# Patient Record
Sex: FEMALE | Race: WHITE | Employment: FULL TIME | URBAN - METROPOLITAN AREA
[De-identification: names, ages, dates, MRNs, and addresses within clinical notes are randomized per-mention and may not be internally consistent; named-entity substitution may affect disease eponyms.]

---

## 2024-01-13 ENCOUNTER — APPOINTMENT (OUTPATIENT)
Dept: RADIOLOGY | Facility: CLINIC | Age: 44
End: 2024-01-13
Payer: COMMERCIAL

## 2024-01-13 DIAGNOSIS — Z12.31 SCREENING MAMMOGRAM FOR BREAST CANCER: ICD-10-CM

## 2024-01-27 ENCOUNTER — HOSPITAL ENCOUNTER (OUTPATIENT)
Dept: RADIOLOGY | Facility: CLINIC | Age: 44
Discharge: HOME | End: 2024-01-27
Payer: COMMERCIAL

## 2024-01-27 DIAGNOSIS — Z12.31 SCREENING MAMMOGRAM FOR BREAST CANCER: ICD-10-CM

## 2024-01-27 PROCEDURE — 77067 SCR MAMMO BI INCL CAD: CPT

## 2024-01-27 PROCEDURE — 77063 BREAST TOMOSYNTHESIS BI: CPT | Performed by: RADIOLOGY

## 2024-01-27 PROCEDURE — 77067 SCR MAMMO BI INCL CAD: CPT | Performed by: RADIOLOGY

## 2024-02-02 ENCOUNTER — HOSPITAL ENCOUNTER (OUTPATIENT)
Dept: RADIOLOGY | Facility: CLINIC | Age: 44
Discharge: HOME | End: 2024-02-02
Payer: COMMERCIAL

## 2024-02-02 DIAGNOSIS — R92.8 OTHER ABNORMAL AND INCONCLUSIVE FINDINGS ON DIAGNOSTIC IMAGING OF BREAST: ICD-10-CM

## 2024-02-02 PROCEDURE — 77061 BREAST TOMOSYNTHESIS UNI: CPT | Mod: LEFT SIDE | Performed by: RADIOLOGY

## 2024-02-02 PROCEDURE — 77061 BREAST TOMOSYNTHESIS UNI: CPT | Mod: LT

## 2024-02-02 PROCEDURE — 77065 DX MAMMO INCL CAD UNI: CPT | Mod: LEFT SIDE | Performed by: RADIOLOGY

## 2024-03-19 ENCOUNTER — OFFICE VISIT (OUTPATIENT)
Dept: OBSTETRICS AND GYNECOLOGY | Facility: CLINIC | Age: 44
End: 2024-03-19
Payer: COMMERCIAL

## 2024-03-19 VITALS
OXYGEN SATURATION: 98 % | HEART RATE: 77 BPM | SYSTOLIC BLOOD PRESSURE: 130 MMHG | DIASTOLIC BLOOD PRESSURE: 83 MMHG | HEIGHT: 59 IN | BODY MASS INDEX: 29.98 KG/M2 | WEIGHT: 148.7 LBS

## 2024-03-19 DIAGNOSIS — N39.3 SUI (STRESS URINARY INCONTINENCE, FEMALE): ICD-10-CM

## 2024-03-19 DIAGNOSIS — Z01.419 ENCOUNTER FOR ANNUAL ROUTINE GYNECOLOGICAL EXAMINATION: Primary | ICD-10-CM

## 2024-03-19 PROCEDURE — 99396 PREV VISIT EST AGE 40-64: CPT | Performed by: OBSTETRICS & GYNECOLOGY

## 2024-03-19 NOTE — PROGRESS NOTES
Subjective   Patient ID: Ashley Chappell is a 43 y.o. female who presents for Annual Exam (LMP 24/PAP 21 /Mammo 24).  HPI  Patient is a 43-year-old  2 para 2 white female whose had 2 spontaneous vaginal deliveries.  Last menstrual period  she said they are typically 1 month apart she will bleed anywhere from 4 to 5 days very manageable currently uses vasectomy for birth control.  She admits to regular bowel movements does complain of leaking urine with coughing sneezing and jumping.  Last normal Pap smear 2021 patient had mammogram in 2024 had to have additional left imaging which was normal repeat in 1 year.  Review of Systems    Objective   Physical Exam  Gen.: Alert and in no acute distress. Well-developed, well-nourished.  Thyroid: Nonenlarged and no palpable thyroid nodules  Cardiovascular: Heart regular rate and rhythm  Pulmonary: Clear bilateral breath sounds  Breasts: Normal appearance, no nipple discharge and no skin changes. No palpable masses and no axillary lymphadenopathy  Abdomen: Soft and nontender, no abdominal mass palpated, no organomegaly   Pelvic: External genitalia normal, Bartholin's urethral and [place]'s glands normal. Vagina normal. Cervix normal. Uterus normal size and shape. Right adnexa normal without masses. Left adnexa normal without masses. Perianal area and normal.  Assessment/Plan   Annual GYN exam, Pap and HPV not needed this year, reviewed recent mammogram follow-up in 1 year.  Discussed stress urinary incontinence, will refer to urogynecology.         Morales Hatch MD 24 3:49 PM

## 2024-03-29 ENCOUNTER — OFFICE VISIT (OUTPATIENT)
Dept: OBSTETRICS AND GYNECOLOGY | Facility: CLINIC | Age: 44
End: 2024-03-29
Payer: COMMERCIAL

## 2024-03-29 VITALS
HEIGHT: 59 IN | WEIGHT: 148 LBS | TEMPERATURE: 97.8 F | BODY MASS INDEX: 29.84 KG/M2 | HEART RATE: 93 BPM | DIASTOLIC BLOOD PRESSURE: 79 MMHG | SYSTOLIC BLOOD PRESSURE: 114 MMHG | RESPIRATION RATE: 16 BRPM

## 2024-03-29 DIAGNOSIS — N39.3 SUI (STRESS URINARY INCONTINENCE, FEMALE): ICD-10-CM

## 2024-03-29 DIAGNOSIS — F17.200 SMOKER: Primary | ICD-10-CM

## 2024-03-29 PROCEDURE — 99204 OFFICE O/P NEW MOD 45 MIN: CPT | Performed by: STUDENT IN AN ORGANIZED HEALTH CARE EDUCATION/TRAINING PROGRAM

## 2024-03-29 PROCEDURE — 99214 OFFICE O/P EST MOD 30 MIN: CPT | Performed by: STUDENT IN AN ORGANIZED HEALTH CARE EDUCATION/TRAINING PROGRAM

## 2024-03-29 SDOH — ECONOMIC STABILITY: FOOD INSECURITY: WITHIN THE PAST 12 MONTHS, THE FOOD YOU BOUGHT JUST DIDN'T LAST AND YOU DIDN'T HAVE MONEY TO GET MORE.: NEVER TRUE

## 2024-03-29 SDOH — ECONOMIC STABILITY: FOOD INSECURITY: WITHIN THE PAST 12 MONTHS, YOU WORRIED THAT YOUR FOOD WOULD RUN OUT BEFORE YOU GOT MONEY TO BUY MORE.: NEVER TRUE

## 2024-03-29 ASSESSMENT — ENCOUNTER SYMPTOMS
DEPRESSION: 0
OCCASIONAL FEELINGS OF UNSTEADINESS: 0
LOSS OF SENSATION IN FEET: 0

## 2024-03-29 ASSESSMENT — COLUMBIA-SUICIDE SEVERITY RATING SCALE - C-SSRS
2. HAVE YOU ACTUALLY HAD ANY THOUGHTS OF KILLING YOURSELF?: NO
6. HAVE YOU EVER DONE ANYTHING, STARTED TO DO ANYTHING, OR PREPARED TO DO ANYTHING TO END YOUR LIFE?: NO
1. IN THE PAST MONTH, HAVE YOU WISHED YOU WERE DEAD OR WISHED YOU COULD GO TO SLEEP AND NOT WAKE UP?: NO

## 2024-03-29 ASSESSMENT — LIFESTYLE VARIABLES
HOW OFTEN DO YOU HAVE SIX OR MORE DRINKS ON ONE OCCASION: NEVER
SKIP TO QUESTIONS 9-10: 1
HOW OFTEN DO YOU HAVE A DRINK CONTAINING ALCOHOL: MONTHLY OR LESS
HOW MANY STANDARD DRINKS CONTAINING ALCOHOL DO YOU HAVE ON A TYPICAL DAY: PATIENT DOES NOT DRINK
AUDIT-C TOTAL SCORE: 1

## 2024-03-29 ASSESSMENT — PATIENT HEALTH QUESTIONNAIRE - PHQ9
1. LITTLE INTEREST OR PLEASURE IN DOING THINGS: NOT AT ALL
2. FEELING DOWN, DEPRESSED OR HOPELESS: NOT AT ALL
SUM OF ALL RESPONSES TO PHQ9 QUESTIONS 1 AND 2: 0

## 2024-03-29 ASSESSMENT — PAIN SCALES - GENERAL: PAINLEVEL: 0-NO PAIN

## 2024-03-29 NOTE — PROGRESS NOTES
Referred by: Dr. Hatch     PCP  Devonte Beasley MD         CHIEF COMPLAINT:  GIANCARLO         HISTORY OF PRESENT ILLNESS:  This is a  43 y.o. y.o. female who presents with urinary incontinence. Noted with coughing, sneezing and jumping. First noted a couple years ago. Worsening recently, sometimes even with brushing her teeth (from slight gagging).     Does kegels at home.     The following were reviewed to gain additional history:  External notes: Dr. Hatch 3/19/24  Test results: pap 11/19/21, neg/neg         Specifically, she describes the following pelvic floor symptoms:          Prolapse: No       - Splinting to urinate: No       - Splinting for bowel movement/stool trapping: No              Incontinence:  Yes             Stress              Urinary Symptoms:       - Frequency:  No             # Voids:         - Nocturia: No             # Voids:         - Urgency:  No       - Incomplete emptying:  No       - Hesitancy:  No       - Pain with voiding:  No       - Excessive fluid intake: Yes, coffee in the morning, then water during the day, up to 100 oz               History:       - Recurrent UTI:  No       - Hematuria:  No       - Stones:  No       - Kidney Disease:  No                  Bowel Symptoms:       - Regular: Yes       - Diarrhea:No       - Constipation: No       - Fecal Incontinence:  No       - Flatus Incontinence:  No       - Fecal urgency:   No    Past medical and surgical hx reviewed - pertinent for no PMH, no abdominal surgery  Smoking history:  current daily cigarette use, 0.25 ppd        Gyn History:  - Menopausal: No  - HRT: No  - Pap up to date: Yes - 2021  - Sexually active:  Yes  - Number of prior vaginal deliveries: 2   Number of prior operative deliveries: 0   Prior OASI? no  - Number of prior c-sections: 0    - Mammogram up to date: Yes -   - Colonoscopy up to date: n/a    OB History    No obstetric history on file.               PHYSICAL EXAMINATION:  Patient's last menstrual period  was 03/05/2024.  There is no height or weight on file to calculate BMI.  LMP 03/05/2024   General Appearance: well appearing  Neuro: Alert and oriented   HEENT: mucous membranes moist, neck supple  Resp: No respiratory distress, normal work of breathing  MSK: normal range of motion, gait appropriate    Pelvic:  Genitourinary: normal external genitalia, Bartholin's glands negative, [place]'s glands negative  Urethra: normal meatus, non-tender, no periurethral mass  Vaginal mucosa  normal  Cervix  normal  Uterus normal size, non-tender, mobile  Adnexae  negative nontender, no masses  Atrophy negative    CST negative    POP-Q (in supine position):  No significant prolapse    Rectal: no hemorrhoids, fissures or masses    PVR (by Ultrasound): 0 ml   Urine dip: No results found for this or any previous visit.       IMPRESSION AND PLAN:  Ashley Chappell is a 43 y.o. who presents with GIANCARLO.    GIANCARLO  - discussed etiology of GIANCARLO and potential risk factors  - reviewed management strategies including PFPT, anti-incontinence ring, urethral bulking injections and midurethral sling placement  - Reviewed options for surgical management of GIANCARLO: midurethral sling versus periurethral bulking injections and steps of each procedure  - Counseled on success rates of each: sling ~95% successful and bulkamid 60-70% success  - Reviewed risks/benefits of each option. Sling carries the benefit of higher success rate but has 2 week recovery period with no lifting over 10 pounds, and involves mesh which carries a 2-4% risk of mesh exposure. Bulking carries lower success rate but has no recovery period and no mesh risk involved.  - **reviewed increased risk of mesh exposure in setting of current cigarette smoking; advised ideal option prior to surgery is to discontinue smoking approx 1 month prior to surgery  - Counseled on management of mesh exposure if that should happen: often managed with vaginal estrogen in the office and rarely requires  return to OR for mesh excision  - Reviewed risk of urinary retention requiring indwelling catheter temporarily which would be removed in the office following surgery  - Would need urodynamics prior to surgery? Yes  - Wanted to know options, PI sheets given today    Will call back if she opts to pursue anything    All questions and concerns were answered and addressed.  The patient expressed understanding and agrees with the plan.     3/29/2024       Kimber Fair MD

## 2025-02-01 ENCOUNTER — HOSPITAL ENCOUNTER (OUTPATIENT)
Dept: RADIOLOGY | Facility: CLINIC | Age: 45
Discharge: HOME | End: 2025-02-01
Payer: COMMERCIAL

## 2025-02-01 VITALS — BODY MASS INDEX: 29.84 KG/M2 | WEIGHT: 148 LBS | HEIGHT: 59 IN

## 2025-02-01 DIAGNOSIS — Z12.31 SCREENING MAMMOGRAM FOR BREAST CANCER: ICD-10-CM

## 2025-02-01 PROCEDURE — 77067 SCR MAMMO BI INCL CAD: CPT | Performed by: RADIOLOGY

## 2025-02-01 PROCEDURE — 77063 BREAST TOMOSYNTHESIS BI: CPT | Performed by: RADIOLOGY

## 2025-02-01 PROCEDURE — 77067 SCR MAMMO BI INCL CAD: CPT

## 2025-02-05 ENCOUNTER — TELEPHONE (OUTPATIENT)
Dept: OBSTETRICS AND GYNECOLOGY | Facility: CLINIC | Age: 45
End: 2025-02-05
Payer: COMMERCIAL

## 2025-02-05 DIAGNOSIS — R92.8 ABNORMAL MAMMOGRAM: Primary | ICD-10-CM

## 2025-02-05 NOTE — TELEPHONE ENCOUNTER
Spoke to patient on the phone. Reviewed recent mammogram and need for additional views. Recent history of additional views needed. Recommend consultation with breast specialist to discuss mammograms.

## 2025-02-10 ENCOUNTER — HOSPITAL ENCOUNTER (OUTPATIENT)
Dept: RADIOLOGY | Facility: CLINIC | Age: 45
Discharge: HOME | End: 2025-02-10
Payer: COMMERCIAL

## 2025-02-10 DIAGNOSIS — R92.8 ABNORMAL MAMMOGRAM: ICD-10-CM

## 2025-02-10 PROCEDURE — 77066 DX MAMMO INCL CAD BI: CPT | Performed by: STUDENT IN AN ORGANIZED HEALTH CARE EDUCATION/TRAINING PROGRAM

## 2025-02-10 PROCEDURE — 77062 BREAST TOMOSYNTHESIS BI: CPT | Performed by: STUDENT IN AN ORGANIZED HEALTH CARE EDUCATION/TRAINING PROGRAM

## 2025-02-10 PROCEDURE — 77066 DX MAMMO INCL CAD BI: CPT

## 2025-02-17 ENCOUNTER — APPOINTMENT (OUTPATIENT)
Dept: SURGICAL ONCOLOGY | Facility: CLINIC | Age: 45
End: 2025-02-17
Payer: COMMERCIAL

## 2025-02-25 NOTE — PROGRESS NOTES
History Of Present Illness :  Ashley Chappell is a 44 y.o. female, patient of Dr. Beasley, who presents on referral from Dr. Jw Hatch for breast evaluation.  The patient was last seen by Dr. Hatch on 3/19/2024.  That note was reviewed.  Following recent mammographic findings, the patient is referred to the [facility] breast clinic for evaluation.    Patient has no complaints with regard to the breasts such as mass, pain, nipple discharge, or skin or nipple changes.    All of the patient's recent imaging was reviewed dating back to .  I personally reviewed all the reports and images.  The patient does have heterogeneously dense breast tissue.  Bilateral digital screening mammography with tomosynthesis on 2022 was negative with no suspicious densities or microcalcifications.  Over a year later on 2024, bilateral digital screening mammography revealed a minor asymmetric lesion in the lower inner quadrant of the left breast posteriorly.  Follow-up diagnostic images on 2024 revealed no persistent lesion at the site in question.  The following year, earlier this month, on 2025, the patient again underwent bilateral digital screening mammography and some minor asymmetric breast tissue was identified mid depth medially on the right side cc view, and anterior to mid depth on the MLO view superiorly with regard to the left breast.  Both of these areas of asymmetric tissue compressed out into normal fibroglandular tissue with diagnostic compression images on 2/10/2025.    Risk factor assessment includes age of 44, age of menarche 15, and age of first live birth of 26.  She is  2 para 2 abortus 0.  Risk factor assessment includes age of 44, age of menarche 15, and age of first live birth of 26.  She is  2 para 2 abortus 0.  She has never had a previous breast biopsy.  She has no family history of breast cancer.    Based on the Breast Cancer Risk Assessment Tool from the National Cancer  [place] (NCI), the patient's breast cancer risk is as follows:    5--Year Risk of Developing Breast Cancer    Patient Risk  0.7%  Average Risk  0.8%  Based on the information provided, the patient's estimated risk for developing invasive breast cancer over the next 5 years is 0.7%, presented in green since hers is lower than the average risk of 0.8% (presented in blue) for women of the same age and race/ethnicity in the general [place] population.      Lifetime Risk of Developing Breast Cancer    Patient Risk  9.9%  Average Risk  12.1%  Based on the information provided, the woman's estimated risk for developing invasive breast cancer over her lifetime (to age 90) is 9.9%, presented in green since hers is lower than the average risk of 12.1% (presented in blue) for women of the same age and race/ethnicity in the general [place] population.    Patient is .  She has 2 children.  She lives in [place].  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    Past Medical History   Past Medical History:   Diagnosis Date    Other specified health status     Known health problems: none        Surgical History    No past surgical history on file.     Allergies   Allergies   Allergen Reactions    Amoxicillin Hives and Rash        Home Meds  Current Outpatient Medications   Medication Instructions    APPLE CIDER VINEGAR ORAL oral    Ca-D3-mag-zinc--karen-boron 600 mg calcium- 800 unit-40 mg tablet,chewable oral        Family History    Family History   Problem Relation Name Age of Onset    Breast cancer Other          maternal cousin        Social History  Social History     Tobacco Use    Smoking status: Every Day     Current packs/day: 0.25     Types: Cigarettes    Smokeless tobacco: Never   Substance Use Topics    Alcohol use: Yes     Comment: occasion    Drug use: Never        Review Of Systems    Review of Systems    General: Not Present- Obesity, Cancer, HIV, MRSA, Recent Cold/Flu, Tired  During the Day and VRE.  HEENT: Not Present- Migraine, Cataracts, Glaucoma, Macular Degeneration and Retinal Detachment.  Respiratory: Not Present- Asthma, Chronic Cough, Difficulty Breathing on Exertion, Difficulty Breathing at Rest, Emphysema, Frequent Bronchitis, Home CPAP/BiPAP, Home Oxygen, Pulmonary Embolus, Pneumonia/TB, Sleep Apnea and Snoring.  Cardiovascular: Not Present- Chest Pain, Congestive Heart Failure, Heart Attack, Coronary Artery Disease, Heart Stent, High Cholesterol/Lipids, Hypertension, Internal Defibrillator, Irregular Heart Beat, Mitral Valve Prolapse, Murmur, Pacemaker and Peripheral Vascular Disease.  Gastrointestinal: Not Present- Heartburn, Hepatitis, Hiatal Hernia, Jaundice, Reflux, Stomach Ulcer and IBS.  Female Genitourinary: Not Present- Kidney Failure, Kidney Stones, Dialysis and Urinary Tract Infection.  Musculoskeletal: Not Present- Arthritis, Back Pain and Fibromyalgia.  Neurological: Not Present- Headaches, Numbness, Tingling, Seizures, Stroke,  Shunt and Weakness.  Psychiatric: Not Present- Anxiety, Bipolar, Depression and Panic Attacks.  Endocrine: Not Present- Diabetes, Hyperthyroidism, Hypothyroidism and Low Blood Sugar.  Hematology: Not Present- Abnormal Bleeding, Anemia and Blood Clots.    Vitals  There were no vitals taken for this visit.     Physical Exam   Breast Physical Exam    General  Mental Status - Alert. General Appearance - Not Anxious, in acute distress or Sickly. Orientation - Oriented X3. Build  & Nutrition - Well nourished. Posture - Normal posture. Gait - Normal. Hydration - Well hydrated. Voice - Normal.    Integumentary  Global Assessment: Upon inspection and palpation of skin surfaces of the - Head/Face: no rashes, ulcers, lesions or evidence of photo damage. No palpable nodules or masses, Neck: no visible lesions or palpable masses, Chest: no  swelling,scarring or lesions. No prominent arteries or veins observed and Abdomen: no scars, rashes or  lesions.    Breast  Nipples: Characteristics - Bilateral - Normal. Discharge - Bilateral - None.  Breast - Bilateral - Non Tender. No Bulging, Dimpling, Inflammation or Scarring.  Moderate sized and symmetric.   Breast Lump: - No Palpable Breast Mass.    Lymphatic  Head & Neck  General Head & Neck Lymphatics:  Bilateral: Description - No Localized lymphadenopathy. Overlying skin - Normal.  Axillary  General Axillary Region:  Bilateral: Description - No Localized lymphadenopathy. Tenderness - Non Tender.    Assessment/Plan   Mrs. Chappell's clinical and mammographic examinations are satisfactory.    She does have heterogeneously dense breast tissue.    Her breast cancer 5-year and lifetime risks are normal.    Recommendations:    Reassured patient that imaging callbacks or additional imaging is not uncommon, and can occur between 5 and 12% of the time following screening mammography; heterogeneously dense breast tissue may slightly increase the chance of additional imaging    Continue monthly self breast exams    Bilateral diagnostic digital mammogram with tomosynthesis in 1 year, Wednesday morning 2/4/2026,  [facility]; patient to see me immediately thereafter in the  breast clinic for imaging review and clinical follow-up

## 2025-02-26 ENCOUNTER — OFFICE VISIT (OUTPATIENT)
Dept: SURGERY | Facility: CLINIC | Age: 45
End: 2025-02-26
Payer: COMMERCIAL

## 2025-02-26 VITALS
HEART RATE: 76 BPM | DIASTOLIC BLOOD PRESSURE: 90 MMHG | OXYGEN SATURATION: 100 % | BODY MASS INDEX: 30.04 KG/M2 | WEIGHT: 149 LBS | TEMPERATURE: 98.3 F | SYSTOLIC BLOOD PRESSURE: 139 MMHG | RESPIRATION RATE: 18 BRPM | HEIGHT: 59 IN

## 2025-02-26 DIAGNOSIS — R92.8 ABNORMAL MAMMOGRAM: ICD-10-CM

## 2025-02-26 DIAGNOSIS — R92.333 HETEROGENEOUSLY DENSE TISSUE OF BOTH BREASTS ON MAMMOGRAPHY: Primary | ICD-10-CM

## 2025-02-26 PROCEDURE — 99204 OFFICE O/P NEW MOD 45 MIN: CPT | Performed by: SURGERY

## 2025-02-26 PROCEDURE — 3008F BODY MASS INDEX DOCD: CPT | Performed by: SURGERY

## 2025-02-26 PROCEDURE — 99214 OFFICE O/P EST MOD 30 MIN: CPT | Performed by: SURGERY

## 2025-02-26 ASSESSMENT — COLUMBIA-SUICIDE SEVERITY RATING SCALE - C-SSRS
6. HAVE YOU EVER DONE ANYTHING, STARTED TO DO ANYTHING, OR PREPARED TO DO ANYTHING TO END YOUR LIFE?: NO
2. HAVE YOU ACTUALLY HAD ANY THOUGHTS OF KILLING YOURSELF?: NO
1. IN THE PAST MONTH, HAVE YOU WISHED YOU WERE DEAD OR WISHED YOU COULD GO TO SLEEP AND NOT WAKE UP?: NO

## 2025-02-26 ASSESSMENT — ENCOUNTER SYMPTOMS
OCCASIONAL FEELINGS OF UNSTEADINESS: 0
DEPRESSION: 0
LOSS OF SENSATION IN FEET: 0

## 2025-02-26 ASSESSMENT — PAIN SCALES - GENERAL: PAINLEVEL_OUTOF10: 0-NO PAIN

## 2025-03-24 ENCOUNTER — APPOINTMENT (OUTPATIENT)
Dept: OBSTETRICS AND GYNECOLOGY | Facility: CLINIC | Age: 45
End: 2025-03-24
Payer: COMMERCIAL

## 2025-03-24 VITALS
DIASTOLIC BLOOD PRESSURE: 84 MMHG | BODY MASS INDEX: 29.84 KG/M2 | HEIGHT: 59 IN | WEIGHT: 148 LBS | SYSTOLIC BLOOD PRESSURE: 131 MMHG

## 2025-03-24 DIAGNOSIS — Z01.419 ENCOUNTER FOR ANNUAL ROUTINE GYNECOLOGICAL EXAMINATION: Primary | ICD-10-CM

## 2025-03-24 PROCEDURE — 1036F TOBACCO NON-USER: CPT | Performed by: OBSTETRICS & GYNECOLOGY

## 2025-03-24 PROCEDURE — 3008F BODY MASS INDEX DOCD: CPT | Performed by: OBSTETRICS & GYNECOLOGY

## 2025-03-24 PROCEDURE — 99396 PREV VISIT EST AGE 40-64: CPT | Performed by: OBSTETRICS & GYNECOLOGY

## 2025-03-24 NOTE — PROGRESS NOTES
Subjective   Patient ID: Ashley Chappell is a 44 y.o. female who presents for Annual Exam.  HPI  Patient presents for annual exam    Patient is a 44-year-old  2 para 2 whose had 2 spontaneous vaginal deliveries.  Her last menstrual period was  she said they are typically 1 month apart she will bleed anywhere from 3 to 5 days currently uses vasectomy for birth control.  She admits to regular bowel movements.  Does complain of occasional incontinence with stress but would like to follow expectantly for now.  Her last normal Pap smear was 2021.  Patient has had mammograms with need for additional views.  Recently had evaluation with breast specialist and is going to follow with them yearly.  Review of Systems    Objective   Physical Exam  Gen.: Alert and in no acute distress. Well-developed, well-nourished.  Thyroid: Nonenlarged and no palpable thyroid nodules  Cardiovascular: Heart regular rate and rhythm  Pulmonary: Clear bilateral breath sounds  Breasts: Normal appearance, no nipple discharge and no skin changes. No palpable masses and no axillary lymphadenopathy  Abdomen: Soft and nontender, no abdominal mass palpated, no organomegaly   Pelvic: External genitalia normal, Bartholin's urethral and [place]'s glands normal. Vagina normal. Cervix normal. Uterus normal size and shape. Right adnexa normal without masses. Left adnexa normal without masses. Perianal area and normal.  Assessment/Plan   Annual GYN exam, Pap and HPV not needed this year, reviewed recent breast specialist consult.  Discussed perimenopause at length.  Follow-up in 1 year or as needed.         Morales Hatch MD 25 4:11 PM